# Patient Record
Sex: FEMALE | URBAN - METROPOLITAN AREA
[De-identification: names, ages, dates, MRNs, and addresses within clinical notes are randomized per-mention and may not be internally consistent; named-entity substitution may affect disease eponyms.]

---

## 2020-02-12 ENCOUNTER — APPOINTMENT (RX ONLY)
Dept: URBAN - METROPOLITAN AREA CLINIC 91 | Facility: CLINIC | Age: 50
Setting detail: DERMATOLOGY
End: 2020-02-12

## 2020-02-12 VITALS — HEIGHT: 60 IN | WEIGHT: 165 LBS

## 2020-02-12 DIAGNOSIS — Z41.9 ENCOUNTER FOR PROCEDURE FOR PURPOSES OTHER THAN REMEDYING HEALTH STATE, UNSPECIFIED: ICD-10-CM

## 2020-02-12 PROCEDURE — ? COSMETIC CONSULTATION - MELASMA

## 2020-02-12 PROCEDURE — ? MEDICATION COUNSELING

## 2020-02-12 PROCEDURE — ? TREATMENT REGIMEN

## 2020-02-12 PROCEDURE — ? PRESCRIPTION

## 2020-02-12 ASSESSMENT — LOCATION DETAILED DESCRIPTION DERM
LOCATION DETAILED: LEFT INFERIOR CENTRAL MALAR CHEEK
LOCATION DETAILED: LEFT INFERIOR MEDIAL MALAR CHEEK

## 2020-02-12 ASSESSMENT — LOCATION SIMPLE DESCRIPTION DERM: LOCATION SIMPLE: LEFT CHEEK

## 2020-02-12 ASSESSMENT — LOCATION ZONE DERM: LOCATION ZONE: FACE

## 2020-02-12 NOTE — PROCEDURE: COSMETIC CONSULTATION - MELASMA
Consultation Charge $ (Use Numbers Only, No Text Please.): 50 Price (Use Numbers Only, No Special Characters Or $): 50

## 2020-02-12 NOTE — PROCEDURE: TREATMENT REGIMEN
Initiate Treatment: HQ 6%+ RA 0.05% cream QHS x 3 months on, one month off. RA 0.05% cream QHS during month off
Otc Regimen: Cerave PM and SPF 30 QAM
Detail Level: Zone

## 2020-02-12 NOTE — PROCEDURE: MEDICATION COUNSELING
Xelyamilez Pregnancy And Lactation Text: This medication is Pregnancy Category D and is not considered safe during pregnancy.  The risk during breast feeding is also uncertain.

## 2020-11-03 ENCOUNTER — APPOINTMENT (RX ONLY)
Dept: URBAN - METROPOLITAN AREA CLINIC 81 | Facility: CLINIC | Age: 50
Setting detail: DERMATOLOGY
End: 2020-11-03

## 2020-11-03 DIAGNOSIS — Z41.9 ENCOUNTER FOR PROCEDURE FOR PURPOSES OTHER THAN REMEDYING HEALTH STATE, UNSPECIFIED: ICD-10-CM

## 2020-11-03 PROCEDURE — ? COSMETIC CONSULTATION: PRODUCTS

## 2020-11-03 PROCEDURE — ? MICROBLADING

## 2020-11-03 ASSESSMENT — LOCATION SIMPLE DESCRIPTION DERM
LOCATION SIMPLE: RIGHT EYEBROW
LOCATION SIMPLE: LEFT EYEBROW

## 2020-11-03 ASSESSMENT — LOCATION DETAILED DESCRIPTION DERM
LOCATION DETAILED: RIGHT CENTRAL EYEBROW
LOCATION DETAILED: LEFT CENTRAL EYEBROW

## 2020-11-03 ASSESSMENT — LOCATION ZONE DERM: LOCATION ZONE: FACE

## 2020-11-03 NOTE — PROCEDURE: MICROBLADING
Price (Use Numbers Only, No Special Characters Or $): 344 Price (Use Numbers Only, No Special Characters Or $): 731

## 2020-11-03 NOTE — PROCEDURE: MICROBLADING
Intro And Consent: Deposit for microblading next week. Explained process of procedure. Discussed, color/shape.\\n\\n\\nAlthough 3D Eyebrow Embroidery is effective in most cases, no guarantee can be made that a specific client will benefit from the procedure. This is a process of inserting pigment into the epidermis of the skin and is a form of semi-permanent tattooing. All instruments which enter the skin or come in contact with body fluids are disposable and disposed of after the visit. Cross contamination guidelines are strictly adhered to. Generally, the results are excellent. However, a perfect result is not a realistic expectation during the first appointment. It is usually to expect a touch up after healing is completed. Initially the color will appear much more vibrant or darker compared to the end result. Usually within 7 days the color will fade 40-50 percent, soften and look more natural. The pigment is semi-permanent and will fade over time and will likely need to be touched up once or twice a year. The procedure was thoroughly explained and the patient expressed their verbalunderstanding.

## 2020-11-17 ENCOUNTER — APPOINTMENT (RX ONLY)
Dept: URBAN - METROPOLITAN AREA CLINIC 81 | Facility: CLINIC | Age: 50
Setting detail: DERMATOLOGY
End: 2020-11-17

## 2020-11-17 DIAGNOSIS — Z41.9 ENCOUNTER FOR PROCEDURE FOR PURPOSES OTHER THAN REMEDYING HEALTH STATE, UNSPECIFIED: ICD-10-CM

## 2020-11-17 PROCEDURE — ? MICROBLADING

## 2020-11-17 ASSESSMENT — LOCATION DETAILED DESCRIPTION DERM
LOCATION DETAILED: RIGHT CENTRAL EYEBROW
LOCATION DETAILED: LEFT CENTRAL EYEBROW

## 2020-11-17 ASSESSMENT — LOCATION SIMPLE DESCRIPTION DERM
LOCATION SIMPLE: LEFT EYEBROW
LOCATION SIMPLE: RIGHT EYEBROW

## 2020-11-17 ASSESSMENT — LOCATION ZONE DERM: LOCATION ZONE: FACE

## 2020-11-17 NOTE — PROCEDURE: MICROBLADING
Procedure Text: 18 ublade #5 bold brown PB TD [id number] Procedure Text: 18 ublade #5 bold brown PB TD [id number]

## 2020-11-17 NOTE — PROCEDURE: MICROBLADING
Intro And Consent: Although 3D Eyebrow Embroidery is effective in most cases, no guarantee can be made that a specific client will benefit from the procedure. This is a process of inserting pigment into the epidermis of the skin and is a form of semi-permanent tattooing. All instruments which enter the skin or come in contact with body fluids are disposable and disposed of after the visit. Cross contamination guidelines are strictly adhered to. Generally, the results are excellent. However, a perfect result is not a realistic expectation during the first appointment. It is usually to expect a touch up after healing is completed. Initially the color will appear much more vibrant or darker compared to the end result. Usually within 7 days the color will fade 40-50 percent, soften and look more natural. The pigment is semi-permanent and will fade over time and will likely need to be touched up once or twice a year. The procedure was thoroughly explained and the patient expressed their verbal understanding./ powder brow process, to cover up old pmu.

## 2020-11-17 NOTE — PROCEDURE: MICROBLADING
Price (Use Numbers Only, No Special Characters Or $): 514 Price (Use Numbers Only, No Special Characters Or $): 542

## 2020-12-16 ENCOUNTER — APPOINTMENT (RX ONLY)
Dept: URBAN - METROPOLITAN AREA CLINIC 81 | Facility: CLINIC | Age: 50
Setting detail: DERMATOLOGY
End: 2020-12-16

## 2020-12-16 DIAGNOSIS — Z41.9 ENCOUNTER FOR PROCEDURE FOR PURPOSES OTHER THAN REMEDYING HEALTH STATE, UNSPECIFIED: ICD-10-CM

## 2020-12-16 PROCEDURE — ? MICROBLADING

## 2020-12-16 ASSESSMENT — LOCATION DETAILED DESCRIPTION DERM
LOCATION DETAILED: LEFT CENTRAL EYEBROW
LOCATION DETAILED: RIGHT CENTRAL EYEBROW

## 2020-12-16 ASSESSMENT — LOCATION SIMPLE DESCRIPTION DERM
LOCATION SIMPLE: RIGHT EYEBROW
LOCATION SIMPLE: LEFT EYEBROW

## 2020-12-16 ASSESSMENT — LOCATION ZONE DERM: LOCATION ZONE: FACE

## 2020-12-16 NOTE — PROCEDURE: MICROBLADING
Procedure Text: Touch up microblading, 18 ublade, touch up, #3 medium brown [id number] 1/2 mixed with #5 bold brown [id number] Procedure Text: Touch up microblading, 18 ublade, touch up, #3 medium brown [id number] 1/2 mixed with #5 bold brown [id number]

## 2022-01-26 ENCOUNTER — APPOINTMENT (RX ONLY)
Dept: URBAN - METROPOLITAN AREA CLINIC 81 | Facility: CLINIC | Age: 52
Setting detail: DERMATOLOGY
End: 2022-01-26

## 2022-01-26 DIAGNOSIS — Z41.9 ENCOUNTER FOR PROCEDURE FOR PURPOSES OTHER THAN REMEDYING HEALTH STATE, UNSPECIFIED: ICD-10-CM

## 2022-01-26 PROCEDURE — ? MICROBLADING

## 2022-01-26 ASSESSMENT — LOCATION SIMPLE DESCRIPTION DERM
LOCATION SIMPLE: RIGHT EYEBROW
LOCATION SIMPLE: LEFT EYEBROW

## 2022-01-26 ASSESSMENT — LOCATION ZONE DERM: LOCATION ZONE: FACE

## 2022-01-26 ASSESSMENT — LOCATION DETAILED DESCRIPTION DERM
LOCATION DETAILED: RIGHT CENTRAL EYEBROW
LOCATION DETAILED: LEFT CENTRAL EYEBROW

## 2022-01-26 NOTE — PROCEDURE: MICROBLADING
Procedure Text: microblading. #6 blackish/brown , 1 year touch up eyebrows, fill in areas where strokes were lighter.

## 2022-01-26 NOTE — PROCEDURE: MICROBLADING
Price (Use Numbers Only, No Special Characters Or $): 877 Price (Use Numbers Only, No Special Characters Or $): 541

## 2023-04-01 NOTE — PROCEDURE: MEDICATION COUNSELING
nausea, vomiting, diarrhea Cephalexin Pregnancy And Lactation Text: This medication is Pregnancy Category B and considered safe during pregnancy.  It is also excreted in breast milk but can be used safely for shorter doses.